# Patient Record
Sex: MALE | Race: WHITE | NOT HISPANIC OR LATINO | URBAN - METROPOLITAN AREA
[De-identification: names, ages, dates, MRNs, and addresses within clinical notes are randomized per-mention and may not be internally consistent; named-entity substitution may affect disease eponyms.]

---

## 2017-12-06 ENCOUNTER — EMERGENCY (EMERGENCY)
Facility: HOSPITAL | Age: 27
LOS: 1 days | Discharge: HOME | End: 2017-12-06
Admitting: EMERGENCY MEDICINE
Payer: COMMERCIAL

## 2017-12-06 VITALS
RESPIRATION RATE: 14 BRPM | DIASTOLIC BLOOD PRESSURE: 74 MMHG | HEART RATE: 88 BPM | OXYGEN SATURATION: 100 % | SYSTOLIC BLOOD PRESSURE: 128 MMHG | TEMPERATURE: 98 F
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
## 2017-12-06 DIAGNOSIS — Z79.899 OTHER LONG TERM (CURRENT) DRUG THERAPY: ICD-10-CM

## 2017-12-06 DIAGNOSIS — F17.200 NICOTINE DEPENDENCE, UNSPECIFIED, UNCOMPLICATED: ICD-10-CM

## 2017-12-06 DIAGNOSIS — S05.02XA INJURY OF CONJUNCTIVA AND CORNEAL ABRASION WITHOUT FOREIGN BODY, LEFT EYE, INITIAL ENCOUNTER: ICD-10-CM
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
## 2017-12-06 DIAGNOSIS — H57.12 OCULAR PAIN, LEFT EYE: ICD-10-CM

## 2017-12-06 PROCEDURE — 99283 EMERGENCY DEPT VISIT LOW MDM: CPT

## 2017-12-06 RX ORDER — OFLOXACIN 0.3 %
1 DROPS OPHTHALMIC (EYE)
Qty: 1 | Refills: 1 | OUTPATIENT
Start: 2017-12-06 | End: 2017-12-19

## 2017-12-06 NOTE — ED PROVIDER NOTE - PROGRESS NOTE DETAILS
at time of discharge pt is well appearing without complaints states feels improved and prescription sent to pt pharmacy. pt without additional questions and provided with follow up with ophthalmology with Dr. Llamas. instructions to return to nearest ER if new onset or worsening symptoms including blurring of vision, swelling of eye or orbit or painful EOM or any additional concerns.

## 2017-12-06 NOTE — ED PROVIDER NOTE - OBJECTIVE STATEMENT
27yo M without PMH with L. eye pain and injury from his cat 1 hour prior to arrival. states some mild blurring of his vision otherwise without pain, bleeding, discahrge from eye, photophobia, or additional complaints. is not a contact lens wearer, no pmh or hx of diabetes. otherwise wtihotu additional complaints.

## 2017-12-06 NOTE — ED PROVIDER NOTE - MEDICAL DECISION MAKING DETAILS
young healthy male with acute trauma to eye 1 hr pta with cat scratch with normal ocular pressure, vision, and fluorescein exam with superificial abrasion. plan to dose ofloxacin ophthalmic per dr. pierce's reccs and follow up with ophtho for continued care. young healthy male with acute trauma to eye 1 hr pta with cat scratch with normal ocular pressure, snellen/vision, and fluorescein exam with superificial abrasion. plan to dose ofloxacin ophthalmic per dr. pierce's reccs and follow up with ophtho for continued care.

## 2017-12-06 NOTE — ED PROVIDER NOTE - NS ED ROS FT
GEN: without fever, chills, diaphoresis, night sweats  HEENT: +eye injury without headache, otalgia, tinnitus, otorrhea, photophobia, diplopia, visual disturbance or deviation, epistaxis, rhinorrhea, sinus congestion, dysphagia, pharyngitis, dysphonia  CHEST: without palpitations, chest pain, shortness of breath, pleuritic chest pain  ABD: without dyspepsia, abd pain, nausea, vomiting, coffee ground or hematemesis, diarrhea, constipation, change in bowel habitus, hematochezia or melena  URO: without polyuria, dysuria, pigmenturia, flank pain  : without lesion, discharge, pruritus  SKIN: without rash, erythema, edema, laceration or abrasion  EXT: without edema, contusion  NEURO: without headache, lightheadedness, paresthesia, numbness, or tingling

## 2017-12-06 NOTE — ED PROVIDER NOTE - CARE PLAN
Principal Discharge DX:	Abrasion of left cornea, initial encounter  Instructions for follow-up, activity and diet:	resume activity and diet as tolerated.

## 2017-12-06 NOTE — ED PROVIDER NOTE - PHYSICAL EXAMINATION
GEN: A&Ox3, GCS 15 with good hygeine, in NAD with good hygeine and dress without malodor  HEAD: NC/AT without gross injury or trauma  EYE: PERRL, with dried blood 3 o'clock of L. eye without conjunctival laceration or bleeding or scabbing. fluorescein exam reveals superficial abrasion <1mm in diameter of cornean not involving sclera without expulsion or running fluorescein. tonometer 18 of L. eye 17 of R. eye and snellen 20/20 vision of R. eye 20/25 L. eye uncorrected.  EENT: without epistaxis, uvular deviation or pharyngeal erythema  CHEST: RRR, normal S1/S2, without adventitia of lung fields throughout  ABD: NBSx4, without distension or tenderness to palpation throughout or hepatosplenomegaly or CVAT  EXT: without edema, erythema, asymmetry